# Patient Record
Sex: FEMALE | URBAN - METROPOLITAN AREA
[De-identification: names, ages, dates, MRNs, and addresses within clinical notes are randomized per-mention and may not be internally consistent; named-entity substitution may affect disease eponyms.]

---

## 2021-06-15 ENCOUNTER — OFFICE VISIT (OUTPATIENT)
Dept: URBAN - METROPOLITAN AREA CLINIC 71 | Facility: CLINIC | Age: 61
End: 2021-06-15
Payer: COMMERCIAL

## 2021-06-15 DIAGNOSIS — M35.00 SICCA SYNDROME: Primary | ICD-10-CM

## 2021-06-15 DIAGNOSIS — B02.9 ZOSTER: ICD-10-CM

## 2021-06-15 PROCEDURE — 92004 COMPRE OPH EXAM NEW PT 1/>: CPT | Performed by: OPHTHALMOLOGY

## 2021-06-15 ASSESSMENT — INTRAOCULAR PRESSURE
OD: 11
OS: 13

## 2021-06-15 NOTE — IMPRESSION/PLAN
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 Pt being monitored by Dr Kvng [place]. Pt reports currently on Pilocarpine and artificial tears. Plan: Discussed. Recommend [facility] Dry Eye formula. Will continue to monitor at this time. Contact office with any issues.

## 2021-06-15 NOTE — IMPRESSION/PLAN
Impression: Zoster: B02.9. Pt having shingles on lower left back, concerned about it affecting her face and eyes. No facial or ocular complications seen today. Plan: Discussed.  Informed patient that it would be rare for shingles to affect multiple bodyparts at the same time and there is no active infection to the eyes or face